# Patient Record
Sex: MALE | URBAN - METROPOLITAN AREA
[De-identification: names, ages, dates, MRNs, and addresses within clinical notes are randomized per-mention and may not be internally consistent; named-entity substitution may affect disease eponyms.]

---

## 2022-04-28 ENCOUNTER — APPOINTMENT (RX ONLY)
Dept: URBAN - METROPOLITAN AREA CLINIC 113 | Facility: CLINIC | Age: 32
Setting detail: DERMATOLOGY
End: 2022-04-28

## 2022-04-28 DIAGNOSIS — L90.5 SCAR CONDITIONS AND FIBROSIS OF SKIN: ICD-10-CM

## 2022-04-28 PROCEDURE — 99202 OFFICE O/P NEW SF 15 MIN: CPT

## 2022-04-28 PROCEDURE — ? ADDITIONAL NOTES

## 2022-04-28 PROCEDURE — ? COUNSELING

## 2022-04-28 PROCEDURE — ? DEFER

## 2022-04-28 PROCEDURE — ? MEDICAL CONSULTATION: MICRONEEDLING

## 2022-04-28 ASSESSMENT — LOCATION SIMPLE DESCRIPTION DERM
LOCATION SIMPLE: RIGHT CHEEK
LOCATION SIMPLE: LEFT CHEEK

## 2022-04-28 ASSESSMENT — LOCATION DETAILED DESCRIPTION DERM
LOCATION DETAILED: LEFT INFERIOR CENTRAL MALAR CHEEK
LOCATION DETAILED: RIGHT INFERIOR CENTRAL MALAR CHEEK

## 2022-04-28 ASSESSMENT — LOCATION ZONE DERM: LOCATION ZONE: FACE

## 2022-04-28 NOTE — HPI: SCAR (COMPLEX), FACE
How Severe Is The Scar?: severe
scar_concerns_texture
Is This A New Presentation, Or A Follow-Up?: Facial Scar

## 2022-04-28 NOTE — PROCEDURE: ADDITIONAL NOTES
Detail Level: Detailed
Additional Notes: Discussed microneedling in [place]. One month apart for each session. \\nShould schedule laser resurfacing with Dr. Kasper in [place]. Due to amount of scarring noted, I recommend starting with laser resurfacing with Dr. Kasper. Patient understands there will be at least a week of downtime after laser resurfacing.
Render Risk Assessment In Note?: no

## 2022-04-28 NOTE — PROCEDURE: DEFER
Other Procedure: microneedling vs laser resurfacing
Detail Level: Zone
Instructions (Optional): Cosmetic $400 per treatment for microneedling vs laser resurfacing
Introduction Text (Please End With A Colon): The following procedure was deferred: